# Patient Record
Sex: MALE | Race: WHITE | Employment: FULL TIME | URBAN - METROPOLITAN AREA
[De-identification: names, ages, dates, MRNs, and addresses within clinical notes are randomized per-mention and may not be internally consistent; named-entity substitution may affect disease eponyms.]

---

## 2024-03-21 ENCOUNTER — TELEPHONE (OUTPATIENT)
Age: 44
End: 2024-03-21

## 2024-03-21 ENCOUNTER — DIRECT ADMIT ORDERS (OUTPATIENT)
Age: 44
End: 2024-03-21

## 2024-03-21 DIAGNOSIS — R94.39 ABNORMAL CARDIOVASCULAR STRESS TEST: Primary | ICD-10-CM

## 2024-03-21 DIAGNOSIS — R94.39 ABNORMAL STRESS TEST: Primary | ICD-10-CM

## 2024-03-21 RX ORDER — SODIUM CHLORIDE 0.9 % (FLUSH) 0.9 %
5-40 SYRINGE (ML) INJECTION EVERY 12 HOURS SCHEDULED
OUTPATIENT
Start: 2024-04-05

## 2024-03-21 RX ORDER — SODIUM CHLORIDE 0.9 % (FLUSH) 0.9 %
5-40 SYRINGE (ML) INJECTION PRN
OUTPATIENT
Start: 2024-04-05

## 2024-03-21 RX ORDER — SODIUM CHLORIDE 9 MG/ML
INJECTION, SOLUTION INTRAVENOUS PRN
OUTPATIENT
Start: 2024-04-05

## 2024-03-21 NOTE — TELEPHONE ENCOUNTER
Spoke with Pt of [facility] schd. For 4/5/24 At 10:30am arrive at 9am Pt aware that they need a  they must stay on site NPO from Midnight the night before. Check in at the second floor Outpt. Reg. Desk. Pt is to have Labs done between 3/21/24-3/29/24 at [facility].   Medications:  Ok to take   VO by Dr. Lombardo/nurse Sarah Macedo Nurse.

## 2024-03-22 ENCOUNTER — DIRECT ADMIT ORDERS (OUTPATIENT)
Age: 44
End: 2024-03-22

## 2024-03-22 DIAGNOSIS — R94.39 ABNORMAL CARDIOVASCULAR STRESS TEST: Primary | ICD-10-CM

## 2024-03-22 RX ORDER — SODIUM CHLORIDE 0.9 % (FLUSH) 0.9 %
5-40 SYRINGE (ML) INJECTION PRN
OUTPATIENT
Start: 2024-03-26

## 2024-03-22 RX ORDER — SODIUM CHLORIDE 0.9 % (FLUSH) 0.9 %
5-40 SYRINGE (ML) INJECTION EVERY 12 HOURS SCHEDULED
OUTPATIENT
Start: 2024-03-26

## 2024-03-22 RX ORDER — SODIUM CHLORIDE 9 MG/ML
INJECTION, SOLUTION INTRAVENOUS PRN
OUTPATIENT
Start: 2024-03-26

## 2024-03-22 NOTE — TELEPHONE ENCOUNTER
Called Pt and let him know that Dr. Macedo office want procedure sooner due to pt having more systems. I have him scheduled for 3/26/24 at 9:15am arriving at 7:45am they are working on the Auth

## 2024-03-25 NOTE — TELEPHONE ENCOUNTER
Spoke To Pt:  Just a reminder not to forget your LHC  scheduled for tomorrow.  Arriving at 7:45am  You will need a  must stay on site  NPO from midnight   check in at the 2nd floor outpt. reg. Desk.  Medications:  Ok to take  VO by /Dr. Macedo/nurse Sarah Macedo Nurse.

## 2024-03-26 ENCOUNTER — HOSPITAL ENCOUNTER (OUTPATIENT)
Facility: HOSPITAL | Age: 44
Setting detail: OUTPATIENT SURGERY
Discharge: HOME | End: 2024-03-26
Attending: STUDENT IN AN ORGANIZED HEALTH CARE EDUCATION/TRAINING PROGRAM | Admitting: STUDENT IN AN ORGANIZED HEALTH CARE EDUCATION/TRAINING PROGRAM
Payer: COMMERCIAL

## 2024-03-26 VITALS
BODY MASS INDEX: 38.36 KG/M2 | SYSTOLIC BLOOD PRESSURE: 118 MMHG | HEIGHT: 76 IN | HEART RATE: 53 BPM | WEIGHT: 315 LBS | DIASTOLIC BLOOD PRESSURE: 74 MMHG | RESPIRATION RATE: 16 BRPM | TEMPERATURE: 97.1 F | OXYGEN SATURATION: 99 %

## 2024-03-26 DIAGNOSIS — Z95.5 STENTED CORONARY ARTERY: Primary | ICD-10-CM

## 2024-03-26 DIAGNOSIS — R94.39 ABNORMAL STRESS TEST: ICD-10-CM

## 2024-03-26 DIAGNOSIS — R94.39 ABNORMAL CARDIOVASCULAR STRESS TEST: ICD-10-CM

## 2024-03-26 LAB
ACT BLD: 228 SECS (ref 79–138)
ACT BLD: 244 SECS (ref 79–138)
CHOLEST SERPL-MCNC: 104 MG/DL
EKG ATRIAL RATE: 45 BPM
EKG DIAGNOSIS: NORMAL
EKG P AXIS: 21 DEGREES
EKG P-R INTERVAL: 172 MS
EKG Q-T INTERVAL: 428 MS
EKG QRS DURATION: 96 MS
EKG QTC CALCULATION (BAZETT): 370 MS
EKG R AXIS: 48 DEGREES
EKG T AXIS: 42 DEGREES
EKG VENTRICULAR RATE: 45 BPM
HDLC SERPL-MCNC: 26 MG/DL
HDLC SERPL: 4 (ref 0–5)
LDLC SERPL CALC-MCNC: 68.8 MG/DL (ref 0–100)
TRIGL SERPL-MCNC: 46 MG/DL
VLDLC SERPL CALC-MCNC: 9.2 MG/DL

## 2024-03-26 PROCEDURE — 92978 ENDOLUMINL IVUS OCT C 1ST: CPT | Performed by: STUDENT IN AN ORGANIZED HEALTH CARE EDUCATION/TRAINING PROGRAM

## 2024-03-26 PROCEDURE — 93010 ELECTROCARDIOGRAM REPORT: CPT | Performed by: INTERNAL MEDICINE

## 2024-03-26 PROCEDURE — C9600 PERC DRUG-EL COR STENT SING: HCPCS | Performed by: STUDENT IN AN ORGANIZED HEALTH CARE EDUCATION/TRAINING PROGRAM

## 2024-03-26 PROCEDURE — 85347 COAGULATION TIME ACTIVATED: CPT

## 2024-03-26 PROCEDURE — 99152 MOD SED SAME PHYS/QHP 5/>YRS: CPT | Performed by: STUDENT IN AN ORGANIZED HEALTH CARE EDUCATION/TRAINING PROGRAM

## 2024-03-26 PROCEDURE — 76937 US GUIDE VASCULAR ACCESS: CPT | Performed by: STUDENT IN AN ORGANIZED HEALTH CARE EDUCATION/TRAINING PROGRAM

## 2024-03-26 PROCEDURE — C1887 CATHETER, GUIDING: HCPCS | Performed by: STUDENT IN AN ORGANIZED HEALTH CARE EDUCATION/TRAINING PROGRAM

## 2024-03-26 PROCEDURE — 93458 L HRT ARTERY/VENTRICLE ANGIO: CPT | Performed by: STUDENT IN AN ORGANIZED HEALTH CARE EDUCATION/TRAINING PROGRAM

## 2024-03-26 PROCEDURE — 92979 ENDOLUMINL IVUS OCT C EA: CPT | Performed by: STUDENT IN AN ORGANIZED HEALTH CARE EDUCATION/TRAINING PROGRAM

## 2024-03-26 PROCEDURE — C1769 GUIDE WIRE: HCPCS | Performed by: STUDENT IN AN ORGANIZED HEALTH CARE EDUCATION/TRAINING PROGRAM

## 2024-03-26 PROCEDURE — 80061 LIPID PANEL: CPT

## 2024-03-26 PROCEDURE — 85347 COAGULATION TIME ACTIVATED: CPT | Performed by: STUDENT IN AN ORGANIZED HEALTH CARE EDUCATION/TRAINING PROGRAM

## 2024-03-26 PROCEDURE — 2500000003 HC RX 250 WO HCPCS: Performed by: STUDENT IN AN ORGANIZED HEALTH CARE EDUCATION/TRAINING PROGRAM

## 2024-03-26 PROCEDURE — 92920 PRQ TRLUML C ANGIOP 1ART&/BR: CPT | Performed by: STUDENT IN AN ORGANIZED HEALTH CARE EDUCATION/TRAINING PROGRAM

## 2024-03-26 PROCEDURE — C1874 STENT, COATED/COV W/DEL SYS: HCPCS | Performed by: STUDENT IN AN ORGANIZED HEALTH CARE EDUCATION/TRAINING PROGRAM

## 2024-03-26 PROCEDURE — 2709999900 HC NON-CHARGEABLE SUPPLY: Performed by: STUDENT IN AN ORGANIZED HEALTH CARE EDUCATION/TRAINING PROGRAM

## 2024-03-26 PROCEDURE — 92929 PR PRQ TRLUML CORONARY STENT W/ANGIO ADDL ART/BRNCH: CPT | Performed by: STUDENT IN AN ORGANIZED HEALTH CARE EDUCATION/TRAINING PROGRAM

## 2024-03-26 PROCEDURE — 6370000000 HC RX 637 (ALT 250 FOR IP): Performed by: STUDENT IN AN ORGANIZED HEALTH CARE EDUCATION/TRAINING PROGRAM

## 2024-03-26 PROCEDURE — C1753 CATH, INTRAVAS ULTRASOUND: HCPCS | Performed by: STUDENT IN AN ORGANIZED HEALTH CARE EDUCATION/TRAINING PROGRAM

## 2024-03-26 PROCEDURE — 82172 ASSAY OF APOLIPOPROTEIN: CPT

## 2024-03-26 PROCEDURE — 99153 MOD SED SAME PHYS/QHP EA: CPT | Performed by: STUDENT IN AN ORGANIZED HEALTH CARE EDUCATION/TRAINING PROGRAM

## 2024-03-26 PROCEDURE — 6360000002 HC RX W HCPCS: Performed by: STUDENT IN AN ORGANIZED HEALTH CARE EDUCATION/TRAINING PROGRAM

## 2024-03-26 PROCEDURE — 92928 PRQ TCAT PLMT NTRAC ST 1 LES: CPT | Performed by: STUDENT IN AN ORGANIZED HEALTH CARE EDUCATION/TRAINING PROGRAM

## 2024-03-26 PROCEDURE — 36415 COLL VENOUS BLD VENIPUNCTURE: CPT

## 2024-03-26 PROCEDURE — 93005 ELECTROCARDIOGRAM TRACING: CPT | Performed by: STUDENT IN AN ORGANIZED HEALTH CARE EDUCATION/TRAINING PROGRAM

## 2024-03-26 PROCEDURE — C1894 INTRO/SHEATH, NON-LASER: HCPCS | Performed by: STUDENT IN AN ORGANIZED HEALTH CARE EDUCATION/TRAINING PROGRAM

## 2024-03-26 PROCEDURE — 6360000004 HC RX CONTRAST MEDICATION: Performed by: STUDENT IN AN ORGANIZED HEALTH CARE EDUCATION/TRAINING PROGRAM

## 2024-03-26 PROCEDURE — C1725 CATH, TRANSLUMIN NON-LASER: HCPCS | Performed by: STUDENT IN AN ORGANIZED HEALTH CARE EDUCATION/TRAINING PROGRAM

## 2024-03-26 DEVICE — STENT ONYXNG35026UX ONYX 3.50X26RX
Type: IMPLANTABLE DEVICE | Status: FUNCTIONAL
Brand: ONYX FRONTIER™

## 2024-03-26 RX ORDER — MIDAZOLAM HYDROCHLORIDE 1 MG/ML
INJECTION INTRAMUSCULAR; INTRAVENOUS PRN
Status: DISCONTINUED | OUTPATIENT
Start: 2024-03-26 | End: 2024-03-26 | Stop reason: HOSPADM

## 2024-03-26 RX ORDER — SODIUM CHLORIDE 0.9 % (FLUSH) 0.9 %
5-40 SYRINGE (ML) INJECTION EVERY 12 HOURS SCHEDULED
Status: DISCONTINUED | OUTPATIENT
Start: 2024-03-26 | End: 2024-03-26 | Stop reason: HOSPADM

## 2024-03-26 RX ORDER — VERAPAMIL HYDROCHLORIDE 2.5 MG/ML
INJECTION, SOLUTION INTRAVENOUS PRN
Status: DISCONTINUED | OUTPATIENT
Start: 2024-03-26 | End: 2024-03-26 | Stop reason: HOSPADM

## 2024-03-26 RX ORDER — ACETAMINOPHEN 325 MG/1
650 TABLET ORAL EVERY 4 HOURS PRN
Status: DISCONTINUED | OUTPATIENT
Start: 2024-03-26 | End: 2024-03-26 | Stop reason: HOSPADM

## 2024-03-26 RX ORDER — LIDOCAINE HYDROCHLORIDE 10 MG/ML
INJECTION, SOLUTION INFILTRATION; PERINEURAL PRN
Status: DISCONTINUED | OUTPATIENT
Start: 2024-03-26 | End: 2024-03-26 | Stop reason: HOSPADM

## 2024-03-26 RX ORDER — ATORVASTATIN CALCIUM 40 MG/1
40 TABLET, FILM COATED ORAL DAILY
Status: ON HOLD | COMMUNITY
End: 2024-03-26 | Stop reason: HOSPADM

## 2024-03-26 RX ORDER — METOPROLOL SUCCINATE 50 MG/1
50 TABLET, EXTENDED RELEASE ORAL DAILY
COMMUNITY

## 2024-03-26 RX ORDER — LISINOPRIL 30 MG/1
30 TABLET ORAL DAILY
COMMUNITY

## 2024-03-26 RX ORDER — ASPIRIN 81 MG/1
81 TABLET ORAL DAILY
COMMUNITY

## 2024-03-26 RX ORDER — SODIUM CHLORIDE 9 MG/ML
INJECTION, SOLUTION INTRAVENOUS PRN
Status: DISCONTINUED | OUTPATIENT
Start: 2024-03-26 | End: 2024-03-26 | Stop reason: HOSPADM

## 2024-03-26 RX ORDER — ROSUVASTATIN CALCIUM 40 MG/1
40 TABLET, COATED ORAL DAILY
Qty: 90 TABLET | Refills: 3 | Status: SHIPPED | OUTPATIENT
Start: 2024-03-26

## 2024-03-26 RX ORDER — HEPARIN SODIUM 1000 [USP'U]/ML
INJECTION, SOLUTION INTRAVENOUS; SUBCUTANEOUS PRN
Status: DISCONTINUED | OUTPATIENT
Start: 2024-03-26 | End: 2024-03-26 | Stop reason: HOSPADM

## 2024-03-26 RX ORDER — SODIUM CHLORIDE 0.9 % (FLUSH) 0.9 %
5-40 SYRINGE (ML) INJECTION PRN
Status: DISCONTINUED | OUTPATIENT
Start: 2024-03-26 | End: 2024-03-26 | Stop reason: HOSPADM

## 2024-03-26 RX ORDER — FENTANYL CITRATE 50 UG/ML
INJECTION, SOLUTION INTRAMUSCULAR; INTRAVENOUS PRN
Status: DISCONTINUED | OUTPATIENT
Start: 2024-03-26 | End: 2024-03-26 | Stop reason: HOSPADM

## 2024-03-26 RX ORDER — HEPARIN SODIUM 200 [USP'U]/100ML
INJECTION, SOLUTION INTRAVENOUS PRN
Status: DISCONTINUED | OUTPATIENT
Start: 2024-03-26 | End: 2024-03-26 | Stop reason: HOSPADM

## 2024-03-26 RX ORDER — EZETIMIBE 10 MG/1
10 TABLET ORAL DAILY
Qty: 90 TABLET | Refills: 3 | Status: SHIPPED | OUTPATIENT
Start: 2024-03-26

## 2024-03-26 NOTE — H&P
Raghavendra Lombardo DO  [facility]  [address], [address]  [place] [address]                                       Office [phone],Fax [phone]    Pre- Cardiac Catheterization Procedure Note    Procedure:  cardaic catheterization  Preprocedure diagnosis:  abnormal calcium score and abnormal stress test  Preprocedure testing:   Stress testing and calciu score    History of Presenting Illness:  See scanned COV note    Review of System:  Constitutional: neg  Resp: neg  Card: cp  Neuro: neg      No current facility-administered medications on file prior to encounter.     Current Outpatient Medications on File Prior to Encounter   Medication Sig Dispense Refill    lisinopril (PRINIVIL;ZESTRIL) 30 MG tablet Take 1 tablet by mouth daily      aspirin 81 MG EC tablet Take 1 tablet by mouth daily      metoprolol succinate (TOPROL XL) 50 MG extended release tablet Take 1 tablet by mouth daily      atorvastatin (LIPITOR) 40 MG tablet Take 1 tablet by mouth daily         No Known Allergies    Physican Exam:    Blood pressure 132/78, pulse 51, temperature 97.1 °F (36.2 °C), temperature source Temporal, resp. rate 14, height 1.93 m (6' 4\"), weight (!) 146.7 kg (323 lb 6.4 oz), SpO2 95 %.    Constitutional:  well-developed and well-nourished. No distress.  Pulmonary/Chest: Effort normal and breath sounds normal. No respiratory distress, wheezes or rales.  Cardiovascular: Normal rate, regular rhythm, S1 S2 .   Neurological:  Alert and oriented. Coordination seems grossly normal.   Psychiatric:  Good insight into underlying medical condition.    ASA: 2  Mallampati: 3    Plan:    Risk and benefit of cardiac catheterization/PCI was described in detail to patient.  (risk of vascular access complication with potential surgical intervention for management, stroke, myocardial infarction, emergent open heart surgery and death)    Informed consent was obtained.  Patient wishes to  proceed with invasive study with potential percutaneous treatment.

## 2024-03-26 NOTE — PROCEDURES
BRIEF PROCEDURE NOTE    Date of Procedure: 3/26/2024   Preoperative Diagnosis: elevated calcium score  Postoperative Diagnosis: stable cad    Procedure: Left heart cath, coronary angiography, moderate sedation, PCI leidy lcx/om, ivux lcx/om, ivus rca  Interventional Cardiologist: Raghavendra Lombardo DO  Assistant: None  Anesthesia: local + IV moderate sedation   I administered moderate sedation throughout this procedure. An independent trained observer pushed medications at my direction, and monitored the patient’s level of consciousness and physiological status throughout.  Estimated Blood Loss: Minimal    Access: right radial artery, 6F  Catheters:  Left coronary:JL 3.5, 5f  Right coronary: JR 4, 5f    Findings:   L Main:large caliber, short vessel mild disease  LAD:very large caliber, ectatic, moderate long diagonal, diffuse mild disease  LCx: very large caliber, ectatic, av groove lcx into om with serial disease > 80%, distal branching of om  RCA:very large caliber, ectatic,  focal proximal into mid-vessel stenosis that is not significant by IVUS, long segment of distal disease with mla <4mm2 by IVUS    LVEDP:  18 mmhg    PCI:  Ebu 3.5, 6f guide  Heparin  Doni blue  Dilated with 3.0 compliant balloon  Ivus  3.5x26mm rubén leidy deployed at high pressure  Post-dilated with 4.0 and 5.0 nc balloon at moderate pressure    Ivus showed 4.0 balloon did not completely appose proximal stent.  Angiographically improved after 5.0 balloon.  Ivus showed concentric narrowing proximal to stent with mla of 8.6mm2              Specimens Removed: None    Implants: rubén leidy    Closure Device: radial TR band    See full cath note.    Complications: none      Findings:  1. Severe diffuse coronary ectasia with positively remodeling of vascularture  2. Severe av groove lcx into om treated with leidy  3. Severe disease of distal rca.  Deferred ad hoc pci due to radiation dosage after lcx pci  4. Elevated lvedp    Plan:    Recommend long term DAPT

## 2024-03-26 NOTE — PROGRESS NOTES
8:03 AM  Patient arrived. ID and allergies verified verbally with patient. Pt voices understanding of procedure to be performed. Consent obtained. Pt prepped for procedure.     9:55 AM  TRANSFER - OUT REPORT:    Verbal report given to Liv Lombardo  being transferred to cath lab for ordered procedure       Report consisted of patient's Situation, Background, Assessment and   Recommendations(SBAR).     Information from the following report(s) Nurse Handoff Report was reviewed with the receiving nurse.           Lines:   Peripheral IV 03/26/24 Proximal;Right;Anterior Forearm (Active)   Site Assessment Clean, dry & intact 03/26/24 0844   Line Status Blood return noted;Flushed 03/26/24 0844   Line Care Cap changed 03/26/24 0844   Phlebitis Assessment No symptoms 03/26/24 0844   Infiltration Assessment 0 03/26/24 0844   Alcohol Cap Used Yes 03/26/24 0844   Dressing Status New dressing applied;Clean, dry & intact 03/26/24 0844   Dressing Type Transparent 03/26/24 0844   Dressing Intervention New 03/26/24 0844        Opportunity for questions and clarification was provided.      Patient transported with:  Registered Nurse    11:15 AM  TRANSFER - IN REPORT:    Verbal report received from Tori colin Lombardo  being received from cath lab for routine post-op      Report consisted of patient's Situation, Background, Assessment and   Recommendations(SBAR).     Information from the following report(s) Nurse Handoff Report was reviewed with the receiving nurse.    Opportunity for questions and clarification was provided.      Assessment completed upon patient's arrival to unit and care assumed.     PCI patient meets criteria for outpatient cardiac rehab. [facility] outpatient cardiac rehab referral will be initiated. Educational handouts provided on: PCI procedure, coronary artery disease, coronary artery risk factors, heart healthy eating, and community resources. Reference information on LT[facility] Outpatient  Cardiac Rehab Program also provided. [place] [place] cardiac rehab staff will contact patent, per telephone call, to assess and schedule program participation    1:11 PM  2 ml air released from TR Band. No bleeding or hematoma noted. Radial and Ulnar pulse on right wrist palpable. Pt tolerated well. Will continue to monitor.    1:16 PM  3 ml air released from TR Band. No bleeding or hematoma noted. Radial and Ulnar pulse on right wrist palpable. Pt tolerated well. Will continue to monitor.    1:21 PM  4 ml air released from TR Band. No bleeding or hematoma noted. Radial and Ulnar pulse on right wrist palpable. Pt tolerated well. Will continue to monitor.    1:27 PM  4 ml air released from TR Band. No bleeding or hematoma noted. Radial and Ulnar pulse on right wrist palpable. Pt tolerated well. Will continue to monitor.    Air release completed. TR Band removed from right wrist. No bleeding or  Hematoma. Dressing applied. Wrist immobilizer in place. Radial and ulnar pulse remain palpable on affected extremity. Pt tolerated well. Instructions given to pt regarding movement and activity restrictions. Pt voiced understanding.    2:45 PM  Discharge instructions and prescriptions reviewed with  patient and family. Opportunity provided for questions. Patient and family verbalized understanding. Signed copy of discharge placed in the front of patient's chart. IV and tele removed.     3:00 PM  Pt sat on side of bed then ambulated around unit and to restroom. Voided. Returned to chair. Right wrist site dressing dry and intact. No bleeding or hematoma. Pt voices no complaints.    3:30 PM  Pt discharged via wheelchair with family. Personal belongings with patient upon discharge.

## 2024-03-26 NOTE — CARDIO/PULMONARY
[facility]- Referral.  Chart review completed.  Results of cath/PCI noted.  Met with patient and wife in cath lab pre/post recovery.  PCI patient meets criteria for outpatient cardiac rehab.  [facility] outpatient cardiac rehab referral is initiated.  Educational handouts reviewed and provided on: PCI procedure, coronary artery disease, coronary artery risk factors, heart healthy eating, and community resources.    Pt is able to state his family history risk factor for CAD.   The format and benefits of participating in an outpatient cardiac rehab program were communicated. Reference information on [facility] Outpatient Cardiac Rehab Program is provided.    Pt states he will undergo another PCI procedure in a few weeks.   [place] cardiac rehab staff will contact patent, per telephone call, to assess and schedule program participation.  JOSE Miller RN

## 2024-03-27 ENCOUNTER — TELEPHONE (OUTPATIENT)
Age: 44
End: 2024-03-27

## 2024-03-27 ENCOUNTER — DIRECT ADMIT ORDERS (OUTPATIENT)
Age: 44
End: 2024-03-27

## 2024-03-27 DIAGNOSIS — I25.119 CORONARY ARTERY DISEASE WITH ANGINA PECTORIS, UNSPECIFIED VESSEL OR LESION TYPE, UNSPECIFIED WHETHER NATIVE OR TRANSPLANTED HEART (HCC): Primary | ICD-10-CM

## 2024-03-27 DIAGNOSIS — I25.10 CAD (CORONARY ARTERY DISEASE): Primary | ICD-10-CM

## 2024-03-27 LAB
ECHO BSA: 2.8 M2
LPA SERPL-SCNC: 167.9 NMOL/L

## 2024-03-27 RX ORDER — SODIUM CHLORIDE 0.9 % (FLUSH) 0.9 %
5-40 SYRINGE (ML) INJECTION EVERY 12 HOURS SCHEDULED
OUTPATIENT
Start: 2024-04-12

## 2024-03-27 RX ORDER — SODIUM CHLORIDE 0.9 % (FLUSH) 0.9 %
5-40 SYRINGE (ML) INJECTION PRN
OUTPATIENT
Start: 2024-04-12

## 2024-03-27 RX ORDER — SODIUM CHLORIDE 9 MG/ML
INJECTION, SOLUTION INTRAVENOUS PRN
OUTPATIENT
Start: 2024-04-12

## 2024-03-27 NOTE — TELEPHONE ENCOUNTER
Spoke with Pt of PCI of RCA schd. For 4/12/24 At 1:00pm arrive at 11:30am Pt aware that they need a  they must stay on site NPO from Midnight the night before. Check in at the second floor Outpt. Reg. Desk. Pt is to have Labs done on 3/25/24   Medications:  Ok to take   VO by /nurse Lamar ROMERO

## 2024-03-27 NOTE — TELEPHONE ENCOUNTER
Spoke with Pt of PCI of RCA schd. For 4/12/24 At 1:00pm arrive at 11:30am Pt aware that they need a  they must stay on site NPO from Midnight the night before. Check in at the second floor Outpt. Reg. Desk. Pt is to have Labs done 3/25/24.   Medications:  Ok to take   VO by /nurse Lamar IVAN

## 2024-03-28 ENCOUNTER — TELEPHONE (OUTPATIENT)
Age: 44
End: 2024-03-28

## 2024-03-28 LAB — APO B SERPL-MCNC: 65 MG/DL

## 2024-03-28 NOTE — TELEPHONE ENCOUNTER
Per Dr. Raghavendra Lombardo:    Please let patient know that his Lp(a) is mildly elevated at 168     ##################################    Spoke with the pts daughter,  Christal, identified the pt with name and . I asked her to have the pt call us for his lab results, she agreed    ####################################

## 2024-03-29 NOTE — TELEPHONE ENCOUNTER
Spoke with the pt, identified the pt with name and . I informed him of his results, and that at this time Dr. Lombardo has not offered any further plan. I advised the pt that I had read that CO-Q 10 can help. Pt stated he is already taking this and will continue.    Pt also said that since his procedure he feels a LOT better, and it's hard not be more active, but he's working on taking it easy until after his upcoming stent.

## 2024-04-11 NOTE — TELEPHONE ENCOUNTER
Spoke To Pt:  Just a reminder not to forget your PCI of RCA scheduled for tomorrow.  Arriving at 11:30am  You will need a  must stay on site  NPO from midnight   check in at the 2nd floor outpt. reg. Desk.  Medications:  Ok to take  Pt is taking Birlinta check with Harriet T if he needs to hold it and she said no   VO by /nurse Lamar IVAN

## 2024-04-12 ENCOUNTER — HOSPITAL ENCOUNTER (OUTPATIENT)
Facility: HOSPITAL | Age: 44
Setting detail: OUTPATIENT SURGERY
Discharge: HOME | End: 2024-04-12
Attending: STUDENT IN AN ORGANIZED HEALTH CARE EDUCATION/TRAINING PROGRAM | Admitting: STUDENT IN AN ORGANIZED HEALTH CARE EDUCATION/TRAINING PROGRAM
Payer: COMMERCIAL

## 2024-04-12 VITALS
DIASTOLIC BLOOD PRESSURE: 65 MMHG | TEMPERATURE: 97.9 F | HEIGHT: 77 IN | BODY MASS INDEX: 37.03 KG/M2 | HEART RATE: 48 BPM | SYSTOLIC BLOOD PRESSURE: 127 MMHG | WEIGHT: 313.6 LBS | RESPIRATION RATE: 15 BRPM | OXYGEN SATURATION: 97 %

## 2024-04-12 DIAGNOSIS — I25.10 CAD (CORONARY ARTERY DISEASE): ICD-10-CM

## 2024-04-12 DIAGNOSIS — I25.10 CORONARY ARTERY DISEASE: ICD-10-CM

## 2024-04-12 LAB
ACT BLD: 233 SECS (ref 79–138)
ACT BLD: 320 SECS (ref 79–138)
ANION GAP SERPL CALC-SCNC: 3 MMOL/L (ref 5–15)
BUN SERPL-MCNC: 13 MG/DL (ref 6–20)
BUN/CREAT SERPL: 13 (ref 12–20)
CALCIUM SERPL-MCNC: 9.2 MG/DL (ref 8.5–10.1)
CHLORIDE SERPL-SCNC: 110 MMOL/L (ref 97–108)
CO2 SERPL-SCNC: 26 MMOL/L (ref 21–32)
CREAT SERPL-MCNC: 1.02 MG/DL (ref 0.7–1.3)
ECHO BSA: 2.78 M2
ERYTHROCYTE [DISTWIDTH] IN BLOOD BY AUTOMATED COUNT: 12.6 % (ref 11.5–14.5)
GLUCOSE SERPL-MCNC: 95 MG/DL (ref 65–100)
HCT VFR BLD AUTO: 42.4 % (ref 36.6–50.3)
HGB BLD-MCNC: 14.8 G/DL (ref 12.1–17)
MCH RBC QN AUTO: 29.8 PG (ref 26–34)
MCHC RBC AUTO-ENTMCNC: 34.9 G/DL (ref 30–36.5)
MCV RBC AUTO: 85.3 FL (ref 80–99)
NRBC # BLD: 0 K/UL (ref 0–0.01)
NRBC BLD-RTO: 0 PER 100 WBC
PLATELET # BLD AUTO: 257 K/UL (ref 150–400)
PMV BLD AUTO: 11.3 FL (ref 8.9–12.9)
POTASSIUM SERPL-SCNC: 4.4 MMOL/L (ref 3.5–5.1)
RBC # BLD AUTO: 4.97 M/UL (ref 4.1–5.7)
SODIUM SERPL-SCNC: 139 MMOL/L (ref 136–145)
WBC # BLD AUTO: 9.9 K/UL (ref 4.1–11.1)

## 2024-04-12 PROCEDURE — 92928 PRQ TCAT PLMT NTRAC ST 1 LES: CPT | Performed by: STUDENT IN AN ORGANIZED HEALTH CARE EDUCATION/TRAINING PROGRAM

## 2024-04-12 PROCEDURE — 85027 COMPLETE CBC AUTOMATED: CPT

## 2024-04-12 PROCEDURE — C1894 INTRO/SHEATH, NON-LASER: HCPCS | Performed by: STUDENT IN AN ORGANIZED HEALTH CARE EDUCATION/TRAINING PROGRAM

## 2024-04-12 PROCEDURE — 93454 CORONARY ARTERY ANGIO S&I: CPT | Performed by: STUDENT IN AN ORGANIZED HEALTH CARE EDUCATION/TRAINING PROGRAM

## 2024-04-12 PROCEDURE — 92978 ENDOLUMINL IVUS OCT C 1ST: CPT | Performed by: STUDENT IN AN ORGANIZED HEALTH CARE EDUCATION/TRAINING PROGRAM

## 2024-04-12 PROCEDURE — 36415 COLL VENOUS BLD VENIPUNCTURE: CPT

## 2024-04-12 PROCEDURE — C9601 PERC DRUG-EL COR STENT BRAN: HCPCS | Performed by: STUDENT IN AN ORGANIZED HEALTH CARE EDUCATION/TRAINING PROGRAM

## 2024-04-12 PROCEDURE — C1874 STENT, COATED/COV W/DEL SYS: HCPCS | Performed by: STUDENT IN AN ORGANIZED HEALTH CARE EDUCATION/TRAINING PROGRAM

## 2024-04-12 PROCEDURE — C1753 CATH, INTRAVAS ULTRASOUND: HCPCS | Performed by: STUDENT IN AN ORGANIZED HEALTH CARE EDUCATION/TRAINING PROGRAM

## 2024-04-12 PROCEDURE — C1887 CATHETER, GUIDING: HCPCS | Performed by: STUDENT IN AN ORGANIZED HEALTH CARE EDUCATION/TRAINING PROGRAM

## 2024-04-12 PROCEDURE — 99152 MOD SED SAME PHYS/QHP 5/>YRS: CPT | Performed by: STUDENT IN AN ORGANIZED HEALTH CARE EDUCATION/TRAINING PROGRAM

## 2024-04-12 PROCEDURE — 2500000003 HC RX 250 WO HCPCS: Performed by: STUDENT IN AN ORGANIZED HEALTH CARE EDUCATION/TRAINING PROGRAM

## 2024-04-12 PROCEDURE — 6360000004 HC RX CONTRAST MEDICATION: Performed by: STUDENT IN AN ORGANIZED HEALTH CARE EDUCATION/TRAINING PROGRAM

## 2024-04-12 PROCEDURE — 85347 COAGULATION TIME ACTIVATED: CPT

## 2024-04-12 PROCEDURE — C9600 PERC DRUG-EL COR STENT SING: HCPCS | Performed by: STUDENT IN AN ORGANIZED HEALTH CARE EDUCATION/TRAINING PROGRAM

## 2024-04-12 PROCEDURE — 2709999900 HC NON-CHARGEABLE SUPPLY: Performed by: STUDENT IN AN ORGANIZED HEALTH CARE EDUCATION/TRAINING PROGRAM

## 2024-04-12 PROCEDURE — 93005 ELECTROCARDIOGRAM TRACING: CPT | Performed by: EMERGENCY MEDICINE

## 2024-04-12 PROCEDURE — 99153 MOD SED SAME PHYS/QHP EA: CPT | Performed by: STUDENT IN AN ORGANIZED HEALTH CARE EDUCATION/TRAINING PROGRAM

## 2024-04-12 PROCEDURE — 80048 BASIC METABOLIC PNL TOTAL CA: CPT

## 2024-04-12 PROCEDURE — 85347 COAGULATION TIME ACTIVATED: CPT | Performed by: STUDENT IN AN ORGANIZED HEALTH CARE EDUCATION/TRAINING PROGRAM

## 2024-04-12 PROCEDURE — C1725 CATH, TRANSLUMIN NON-LASER: HCPCS | Performed by: STUDENT IN AN ORGANIZED HEALTH CARE EDUCATION/TRAINING PROGRAM

## 2024-04-12 PROCEDURE — 6370000000 HC RX 637 (ALT 250 FOR IP): Performed by: STUDENT IN AN ORGANIZED HEALTH CARE EDUCATION/TRAINING PROGRAM

## 2024-04-12 PROCEDURE — 92929 PR PRQ TRLUML CORONARY STENT W/ANGIO ADDL ART/BRNCH: CPT | Performed by: STUDENT IN AN ORGANIZED HEALTH CARE EDUCATION/TRAINING PROGRAM

## 2024-04-12 PROCEDURE — 6360000002 HC RX W HCPCS: Performed by: STUDENT IN AN ORGANIZED HEALTH CARE EDUCATION/TRAINING PROGRAM

## 2024-04-12 PROCEDURE — C1769 GUIDE WIRE: HCPCS | Performed by: STUDENT IN AN ORGANIZED HEALTH CARE EDUCATION/TRAINING PROGRAM

## 2024-04-12 DEVICE — STENT ONYXNG40012UX ONYX 4.00X12RX
Type: IMPLANTABLE DEVICE | Site: HEART | Status: FUNCTIONAL
Brand: ONYX FRONTIER™

## 2024-04-12 DEVICE — STENT ONYXNG35015UX ONYX 3.50X15RX
Type: IMPLANTABLE DEVICE | Site: HEART | Status: FUNCTIONAL
Brand: ONYX FRONTIER™

## 2024-04-12 DEVICE — STENT ONYXNG30038UX ONYX 3.00X38RX
Type: IMPLANTABLE DEVICE | Site: HEART | Status: FUNCTIONAL
Brand: ONYX FRONTIER™

## 2024-04-12 RX ORDER — SODIUM CHLORIDE 0.9 % (FLUSH) 0.9 %
5-40 SYRINGE (ML) INJECTION EVERY 12 HOURS SCHEDULED
Status: DISCONTINUED | OUTPATIENT
Start: 2024-04-12 | End: 2024-04-12 | Stop reason: HOSPADM

## 2024-04-12 RX ORDER — LIDOCAINE HYDROCHLORIDE 10 MG/ML
INJECTION, SOLUTION INFILTRATION; PERINEURAL PRN
Status: DISCONTINUED | OUTPATIENT
Start: 2024-04-12 | End: 2024-04-12 | Stop reason: HOSPADM

## 2024-04-12 RX ORDER — SODIUM CHLORIDE 0.9 % (FLUSH) 0.9 %
5-40 SYRINGE (ML) INJECTION PRN
Status: DISCONTINUED | OUTPATIENT
Start: 2024-04-12 | End: 2024-04-12 | Stop reason: HOSPADM

## 2024-04-12 RX ORDER — FENTANYL CITRATE 50 UG/ML
INJECTION, SOLUTION INTRAMUSCULAR; INTRAVENOUS PRN
Status: DISCONTINUED | OUTPATIENT
Start: 2024-04-12 | End: 2024-04-12 | Stop reason: HOSPADM

## 2024-04-12 RX ORDER — ACETAMINOPHEN 325 MG/1
650 TABLET ORAL EVERY 4 HOURS PRN
Status: DISCONTINUED | OUTPATIENT
Start: 2024-04-12 | End: 2024-04-12 | Stop reason: HOSPADM

## 2024-04-12 RX ORDER — VERAPAMIL HYDROCHLORIDE 2.5 MG/ML
INJECTION, SOLUTION INTRAVENOUS PRN
Status: DISCONTINUED | OUTPATIENT
Start: 2024-04-12 | End: 2024-04-12 | Stop reason: HOSPADM

## 2024-04-12 RX ORDER — HEPARIN SODIUM 1000 [USP'U]/ML
INJECTION, SOLUTION INTRAVENOUS; SUBCUTANEOUS PRN
Status: DISCONTINUED | OUTPATIENT
Start: 2024-04-12 | End: 2024-04-12 | Stop reason: HOSPADM

## 2024-04-12 RX ORDER — SODIUM CHLORIDE 9 MG/ML
INJECTION, SOLUTION INTRAVENOUS PRN
Status: DISCONTINUED | OUTPATIENT
Start: 2024-04-12 | End: 2024-04-12 | Stop reason: HOSPADM

## 2024-04-12 RX ORDER — MIDAZOLAM HYDROCHLORIDE 1 MG/ML
INJECTION INTRAMUSCULAR; INTRAVENOUS PRN
Status: DISCONTINUED | OUTPATIENT
Start: 2024-04-12 | End: 2024-04-12 | Stop reason: HOSPADM

## 2024-04-12 RX ADMIN — ACETAMINOPHEN 650 MG: 325 TABLET ORAL at 14:39

## 2024-04-12 ASSESSMENT — PAIN DESCRIPTION - ORIENTATION: ORIENTATION: RIGHT

## 2024-04-12 ASSESSMENT — PAIN DESCRIPTION - DESCRIPTORS: DESCRIPTORS: SORE

## 2024-04-12 ASSESSMENT — PAIN SCALES - GENERAL: PAINLEVEL_OUTOF10: 3

## 2024-04-12 ASSESSMENT — PAIN DESCRIPTION - LOCATION: LOCATION: ARM

## 2024-04-12 NOTE — CARDIO/PULMONARY
[facility]- Chart review.  Noted is today's staged PCI.  Pt was seen by cardiac rehab staff on 3/26/24.  Referral for outpatient Phase II cardiac rehab was placed on 3/26/24.  Pt received risk factor, coronary artery angiography and PCI procedure, and nutritional resources at that time.   It was known of the staged procedure occurring today, so Intake appt was not made.   Now that the staged PCI has occurred, Pt will be contacted by [facility] staff to discuss program participation.  JOSE Miller RN

## 2024-04-12 NOTE — PROCEDURES
BRIEF PROCEDURE NOTE    Date of Procedure: 4/12/2024   Preoperative Diagnosis: staged pci of rca  Postoperative Diagnosis:  CAD    Procedure: Left heart cath, coronary angiography, moderate sedation  Interventional Cardiologist: Raghavendra Lombardo DO  Assistant: None  Anesthesia: local + IV moderate sedation   I administered moderate sedation throughout this procedure. An independent trained observer pushed medications at my direction, and monitored the patient’s level of consciousness and physiological status throughout.  Estimated Blood Loss: Minimal    Access: right radial artery, 6F  Catheters:  Left coronary:JL 3.5, 5f  Right coronary: al 0.75 6F    Findings:   Findings:   L Main:large caliber, short vessel mild disease  LAD:very large caliber, ectatic, moderate long diagonal, diffuse mild disease  LCx: very large caliber, ectatic, av groove lcx into om with serial disease > 80%, distal branching of om  RCA:very large caliber, ectatic,  focal proximal into mid-vessel stenosis upon further review by IVUS showed thin cap fatty plaque, long segment of distal disease with mla <4mm2 by IVUS      PCI:  Heparin  AL 0.75 6F guide  Doni blue  Ivus    Distal RCA treated with 3.0x38mm rubén leidy.   Plaque shift proximal.  Overlapping proximal 3.5x12mm rubén leidy.  Stents post-dilated with 3.5 nc balloon at high pressure.    Proximal into mid-RCA treated with 4.0x12mm rubén leidy, post-dilated with 5.0 nc balloon.    Ivus showed expanded stents      Specimens Removed: None    Implants: rubén leidy x 2    Closure Device: radial TR band    See full cath note.    Complications: none      Findings:  1. Distal RCA treaated with overlapping LEIDY x2  2. Proximal RCA treated with LEIDY    Plan:    DAPT  Elevated lp(a).  Recommend repeat lipids, may benefit from pcsk9i therapy    [place] Raghavendra Desai DO  [address], [address]  [place] [address]                                              Office [phone],Fax [phone]

## 2024-04-12 NOTE — PROGRESS NOTES
12:15 PM  Patient arrived. ID and allergies verified verbally with patient. Pt voices understanding of procedure to be performed. Consent obtained. Pt prepped for procedure. Pt denies contrast allergy. Patient denies taking any blood thinners.    12:52 PM  TRANSFER - OUT REPORT:    Verbal report given to FLORENCE Sims on Sebastian Lombardo  being transferred to cath lab for ordered procedure       Report consisted of patient's Situation, Background, Assessment and   Recommendations(SBAR).     Information from the following report(s) Nurse Handoff Report was reviewed with the receiving nurse.      Lines:   Peripheral IV 04/12/24 Right Forearm (Active)      Opportunity for questions and clarification was provided.      Patient transported with:  Registered Nurse    3:57 PM  3 ml air released from TR Band. No bleeding or hematoma noted. Radial and Ulnar pulse on R wrist palpable. Pt tolerated well. Will continue to monitor.    4:05 PM  3 ml air released from TR Band. No bleeding or hematoma noted. Radial and Ulnar pulse on R wrist palpable. Pt tolerated well. Will continue to monitor.    Discharge instructions given to patient and family. Time given to ask questions and gain clarity. No questions at this time. Patient and family confirm understanding of instructions given. Patient given copy of discharge instructions to take home.    4:10 PM  3 ml air released from TR Band. No bleeding or hematoma noted. Radial and Ulnar pulse on R wrist palpable. Pt tolerated well. Will continue to monitor.    4:15 PM  Air release completed. TR Band removed from R wrist. No bleeding or  Hematoma. Dressing applied. Wrist immobilizer in place. Radial and ulnar pulse remain palpable on affected extremity. Pt tolerated well. Instructions given to pt regarding movement and activity restrictions. Pt voiced understanding.     5:30 PM  Pt discharged via wheelchair with wife. Personal belongings with patient upon discharge.

## 2024-04-12 NOTE — DISCHARGE INSTRUCTIONS
[facility]  RADIAL CARDIAC CATHETERIZATION AND INTERVENTIONAL DISCHARGE INSTRUCTIONS       MEDICATIONS:  Take only medications ordered by your provider.     ACTIVITIES:  While the wound is healing; bleeding or swelling can occur as a result of stress or strain.  Carefully follow these guidelines:    DO NOT DRIVE for 24 hours after the procedure or as instructed by your provider.  You may shower 24 hours after your procedure. No soaking the wrist for 7 days (i.e., bath tub, swimming, washing dishes).  You may return to work in 3 days.  It is essential that you DO NOT SMOKE.  Do not bend your wrist for 24 hours.  Do not lift more than 3-5 pounds with affected wrist for 1 week.     SITE CARE:  Keep site clean and dry.  Avoid lotions, ointments or powders at the wound site for 1 week.  Check the procedural site for any signs of infection (redness, drainage, swelling).  You may notice a small, hard lump or small bruise at the puncture site. This is normal and will clear in about 2 weeks.  If the lump increases in size; apply firm, direct pressure over the site. Notify your physician.  If there is a small amount of bleeding at the site; apply firm, direct continuous pressure over the site against the puncture site for 10 minutes. Your nurse will review this with you. Notify your physician.  If bleeding does not stop after 10 minutes or if there is a large amount of bleeding, call for an ambulance immediately. Continue to hold pressure until help arrives.     WHEN TO CALL YOUR DOCTOR:  Angina - if your angina symptoms return; use your nitroglycerin as instructed by your provider. If you do not have nitroglycerin or if your symptoms do not completely resolve after 10 -15 minutes, call an ambulance. Do not drive yourself to the hospital.  Warmth, redness, drainage and/or pain at the procedural site or temperature over 101°F.  Persistent tenderness or pain at procedural site.  Swelling, coolness, numbness and

## 2024-04-12 NOTE — H&P
Raghavendra Lombardo DO  [facility]  [address], [address]  [place] [address]                                       Office [phone],Fax [phone]    Pre- Cardiac Catheterization Procedure Note    Procedure:  pci rca  Preprocedure diagnosis:  cad  Preprocedure testin24    CARDIAC PROCEDURE 2024  4:32 PM (Final)    Conclusion    Severe diffuse coronary ectasia with positively remodeling of vascularture    Severe av groove lcx into om treated with leidy    Severe disease of distal rca.  Deferred ad hoc pci due to radiation dosage after lcx pci    Elevated lvedp    Findings:  L Main:large caliber, short vessel mild disease  LAD:very large caliber, ectatic, moderate long diagonal, diffuse mild disease  LCx: very large caliber, ectatic, av groove lcx into om with serial disease > 80%, distal branching of om  RCA:very large caliber, ectatic,  focal proximal into mid-vessel stenosis that is not significant by IVUS, long segment of distal disease with mla <4mm2 by IVUS    LVEDP:  18 mmhg    PCI:  Ebu 3.5, 6f guide  Heparin  Doni blue  Dilated with 3.0 compliant balloon  Ivus  3.5x26mm rubén leidy deployed at high pressure  Post-dilated with 4.0 and 5.0 nc balloon at moderate pressure    Ivus showed 4.0 balloon did not completely appose proximal stent.  Angiographically improved after 5.0 balloon.  Ivus showed concentric narrowing proximal to stent with mla of 8.6mm2    Complications: none    Signed by: Raghavendra Lombardo DO on 3/27/2024  4:32 PM      History of Presenting Illness:  Pt presents for staged pci of RCA    Review of System:  Constitutional: neg  Resp: neg  Card: neg  Neuro: neg      No current facility-administered medications on file prior to encounter.     Current Outpatient Medications on File Prior to Encounter   Medication Sig Dispense Refill    lisinopril (PRINIVIL;ZESTRIL) 30 MG tablet Take 1 tablet by mouth daily      aspirin 81 MG EC tablet

## 2024-04-15 LAB
EKG ATRIAL RATE: 52 BPM
EKG DIAGNOSIS: NORMAL
EKG P AXIS: 21 DEGREES
EKG P-R INTERVAL: 178 MS
EKG Q-T INTERVAL: 426 MS
EKG QRS DURATION: 98 MS
EKG QTC CALCULATION (BAZETT): 396 MS
EKG R AXIS: 50 DEGREES
EKG T AXIS: 47 DEGREES
EKG VENTRICULAR RATE: 52 BPM

## 2024-04-15 PROCEDURE — 93010 ELECTROCARDIOGRAM REPORT: CPT | Performed by: SPECIALIST

## 2024-05-13 ENCOUNTER — HOSPITAL ENCOUNTER (OUTPATIENT)
Facility: HOSPITAL | Age: 44
Setting detail: RECURRING SERIES
Discharge: HOME | End: 2024-05-16
Attending: STUDENT IN AN ORGANIZED HEALTH CARE EDUCATION/TRAINING PROGRAM
Payer: COMMERCIAL

## 2024-05-13 PROCEDURE — 93798 PHYS/QHP OP CAR RHAB W/ECG: CPT

## 2024-05-13 PROCEDURE — 93797 PHYS/QHP OP CAR RHAB WO ECG: CPT

## 2024-05-13 RX ORDER — UBIDECARENONE 30 MG
100 CAPSULE ORAL DAILY
COMMUNITY

## 2024-05-13 ASSESSMENT — EXERCISE STRESS TEST
PEAK_BP: 136/78
PEAK_RPE: 9
PEAK_RPE: 9
PEAK_HR: 78
PEAK_METS: 2.9
PEAK_HR: 78
PEAK_BP: 136/78

## 2024-05-13 ASSESSMENT — PATIENT HEALTH QUESTIONNAIRE - PHQ9
SUM OF ALL RESPONSES TO PHQ QUESTIONS 1-9: 0
4. FEELING TIRED OR HAVING LITTLE ENERGY: NOT AT ALL
2. FEELING DOWN, DEPRESSED OR HOPELESS: NOT AT ALL
SUM OF ALL RESPONSES TO PHQ QUESTIONS 1-9: 0
10. IF YOU CHECKED OFF ANY PROBLEMS, HOW DIFFICULT HAVE THESE PROBLEMS MADE IT FOR YOU TO DO YOUR WORK, TAKE CARE OF THINGS AT HOME, OR GET ALONG WITH OTHER PEOPLE: NOT DIFFICULT AT ALL
SUM OF ALL RESPONSES TO PHQ QUESTIONS 1-9: 0
9. THOUGHTS THAT YOU WOULD BE BETTER OFF DEAD, OR OF HURTING YOURSELF: NOT AT ALL
SUM OF ALL RESPONSES TO PHQ9 QUESTIONS 1 & 2: 0
8. MOVING OR SPEAKING SO SLOWLY THAT OTHER PEOPLE COULD HAVE NOTICED. OR THE OPPOSITE, BEING SO FIGETY OR RESTLESS THAT YOU HAVE BEEN MOVING AROUND A LOT MORE THAN USUAL: NOT AT ALL
3. TROUBLE FALLING OR STAYING ASLEEP: NOT AT ALL
7. TROUBLE CONCENTRATING ON THINGS, SUCH AS READING THE NEWSPAPER OR WATCHING TELEVISION: NOT AT ALL
1. LITTLE INTEREST OR PLEASURE IN DOING THINGS: NOT AT ALL
5. POOR APPETITE OR OVEREATING: NOT AT ALL
6. FEELING BAD ABOUT YOURSELF - OR THAT YOU ARE A FAILURE OR HAVE LET YOURSELF OR YOUR FAMILY DOWN: NOT AT ALL
SUM OF ALL RESPONSES TO PHQ QUESTIONS 1-9: 0

## 2024-05-13 ASSESSMENT — LIFESTYLE VARIABLES
ALCOHOL_TYPE: BEER
ALCOHOL_AMOUNT: 2
SMOKELESS_TOBACCO: NO
ALCOHOL_USE: WEEKLY

## 2024-05-13 ASSESSMENT — EJECTION FRACTION: EF_VALUE: 55

## 2024-05-13 NOTE — CARDIO/PULMONARY
final MET level of 2.9. Prior to October 2023, pt reported he had been walking 6-8 miles at least twice a week.     Exercise prescription developed using exercise tolerance results and patient stated goals, to be supplemented with home exercise recommendations. Education manual given to patient and reviewed. Patient will attend cardiac rehab 2-3 times/week over 18 weeks, which will include both exercise and education sessions.    Patient states that he would like to accomplish the following by completion of the program: get back to walking regularly, learn more about heart healthy diet, and lose weight.    Intake Start Time: 0830  Intake End Time: 1001    CARYN KELLY RN

## 2024-05-17 ENCOUNTER — HOSPITAL ENCOUNTER (OUTPATIENT)
Facility: HOSPITAL | Age: 44
Setting detail: RECURRING SERIES
Discharge: HOME | End: 2024-05-20
Attending: STUDENT IN AN ORGANIZED HEALTH CARE EDUCATION/TRAINING PROGRAM
Payer: COMMERCIAL

## 2024-05-17 VITALS — WEIGHT: 308.4 LBS | BODY MASS INDEX: 36.57 KG/M2

## 2024-05-17 VITALS — WEIGHT: 310.4 LBS | HEIGHT: 77 IN | BODY MASS INDEX: 36.65 KG/M2

## 2024-05-17 PROCEDURE — 93798 PHYS/QHP OP CAR RHAB W/ECG: CPT

## 2024-05-17 ASSESSMENT — EXERCISE STRESS TEST
PEAK_HR: 94
PEAK_METS: 2.9
PEAK_RPE: 10

## 2024-05-20 ENCOUNTER — HOSPITAL ENCOUNTER (OUTPATIENT)
Facility: HOSPITAL | Age: 44
Setting detail: RECURRING SERIES
Discharge: HOME | End: 2024-05-23
Attending: STUDENT IN AN ORGANIZED HEALTH CARE EDUCATION/TRAINING PROGRAM
Payer: COMMERCIAL

## 2024-05-20 VITALS — BODY MASS INDEX: 36.52 KG/M2 | WEIGHT: 308 LBS

## 2024-05-20 PROCEDURE — 93798 PHYS/QHP OP CAR RHAB W/ECG: CPT

## 2024-05-20 ASSESSMENT — EXERCISE STRESS TEST
PEAK_HR: 100
PEAK_METS: 2.9
PEAK_RPE: 10

## 2024-05-24 ENCOUNTER — HOSPITAL ENCOUNTER (OUTPATIENT)
Facility: HOSPITAL | Age: 44
Setting detail: RECURRING SERIES
Discharge: HOME | End: 2024-05-27
Attending: STUDENT IN AN ORGANIZED HEALTH CARE EDUCATION/TRAINING PROGRAM
Payer: COMMERCIAL

## 2024-05-24 VITALS — WEIGHT: 306.4 LBS | BODY MASS INDEX: 36.33 KG/M2

## 2024-05-24 PROCEDURE — 93798 PHYS/QHP OP CAR RHAB W/ECG: CPT

## 2024-05-24 ASSESSMENT — EXERCISE STRESS TEST
PEAK_METS: 3.4
PEAK_HR: 132
PEAK_RPE: 10

## 2024-05-28 ENCOUNTER — HOSPITAL ENCOUNTER (OUTPATIENT)
Facility: HOSPITAL | Age: 44
Setting detail: RECURRING SERIES
Discharge: HOME | End: 2024-05-31
Attending: STUDENT IN AN ORGANIZED HEALTH CARE EDUCATION/TRAINING PROGRAM
Payer: COMMERCIAL

## 2024-05-28 VITALS — WEIGHT: 306.2 LBS | BODY MASS INDEX: 36.31 KG/M2

## 2024-05-28 PROCEDURE — 93798 PHYS/QHP OP CAR RHAB W/ECG: CPT

## 2024-05-28 ASSESSMENT — EXERCISE STRESS TEST
PEAK_METS: 4.2
PEAK_RPE: 12
PEAK_HR: 147

## 2024-05-31 ENCOUNTER — HOSPITAL ENCOUNTER (OUTPATIENT)
Facility: HOSPITAL | Age: 44
Setting detail: RECURRING SERIES
End: 2024-05-31
Attending: STUDENT IN AN ORGANIZED HEALTH CARE EDUCATION/TRAINING PROGRAM
Payer: COMMERCIAL

## 2024-05-31 VITALS — WEIGHT: 305.8 LBS | BODY MASS INDEX: 36.26 KG/M2

## 2024-05-31 PROCEDURE — 93798 PHYS/QHP OP CAR RHAB W/ECG: CPT

## 2024-05-31 ASSESSMENT — EXERCISE STRESS TEST
PEAK_HR: 151
PEAK_METS: 6.2
PEAK_RPE: 13

## 2024-06-03 ENCOUNTER — HOSPITAL ENCOUNTER (OUTPATIENT)
Facility: HOSPITAL | Age: 44
Setting detail: RECURRING SERIES
Discharge: HOME | End: 2024-06-06
Attending: STUDENT IN AN ORGANIZED HEALTH CARE EDUCATION/TRAINING PROGRAM
Payer: COMMERCIAL

## 2024-06-03 VITALS — WEIGHT: 302.4 LBS | BODY MASS INDEX: 35.86 KG/M2

## 2024-06-03 PROCEDURE — 93798 PHYS/QHP OP CAR RHAB W/ECG: CPT

## 2024-06-03 ASSESSMENT — EXERCISE STRESS TEST
PEAK_HR: 147
PEAK_RPE: 13
PEAK_METS: 4.2

## 2024-06-07 ENCOUNTER — HOSPITAL ENCOUNTER (OUTPATIENT)
Facility: HOSPITAL | Age: 44
Setting detail: RECURRING SERIES
Discharge: HOME | End: 2024-06-10
Attending: STUDENT IN AN ORGANIZED HEALTH CARE EDUCATION/TRAINING PROGRAM
Payer: COMMERCIAL

## 2024-06-07 VITALS — BODY MASS INDEX: 35.86 KG/M2 | WEIGHT: 302.4 LBS

## 2024-06-07 PROCEDURE — 93798 PHYS/QHP OP CAR RHAB W/ECG: CPT

## 2024-06-07 ASSESSMENT — EXERCISE STRESS TEST
PEAK_METS: 4.2
PEAK_RPE: 13
PEAK_HR: 153

## 2024-06-07 ASSESSMENT — LIFESTYLE VARIABLES
SMOKELESS_TOBACCO: NO
ALCOHOL_AMOUNT: 2
ALCOHOL_TYPE: BEER
ALCOHOL_USE: WEEKLY

## 2024-06-07 ASSESSMENT — EJECTION FRACTION: EF_VALUE: 55

## 2024-06-10 ENCOUNTER — HOSPITAL ENCOUNTER (OUTPATIENT)
Facility: HOSPITAL | Age: 44
Setting detail: RECURRING SERIES
Discharge: HOME | End: 2024-06-13
Attending: STUDENT IN AN ORGANIZED HEALTH CARE EDUCATION/TRAINING PROGRAM
Payer: COMMERCIAL

## 2024-06-10 VITALS — BODY MASS INDEX: 36.03 KG/M2 | WEIGHT: 303.8 LBS

## 2024-06-10 PROCEDURE — 93798 PHYS/QHP OP CAR RHAB W/ECG: CPT

## 2024-06-10 ASSESSMENT — EXERCISE STRESS TEST
PEAK_HR: 141
PEAK_METS: 4.2
PEAK_RPE: 13

## 2024-06-12 ENCOUNTER — HOSPITAL ENCOUNTER (OUTPATIENT)
Facility: HOSPITAL | Age: 44
Setting detail: RECURRING SERIES
Discharge: HOME | End: 2024-06-15
Attending: STUDENT IN AN ORGANIZED HEALTH CARE EDUCATION/TRAINING PROGRAM
Payer: COMMERCIAL

## 2024-06-12 VITALS — WEIGHT: 304 LBS | BODY MASS INDEX: 36.05 KG/M2

## 2024-06-12 PROCEDURE — 93798 PHYS/QHP OP CAR RHAB W/ECG: CPT

## 2024-06-12 PROCEDURE — 93797 PHYS/QHP OP CAR RHAB WO ECG: CPT

## 2024-06-12 ASSESSMENT — EXERCISE STRESS TEST
PEAK_METS: 6.1
PEAK_RPE: 13-14
PEAK_HR: 147

## 2024-06-14 ENCOUNTER — APPOINTMENT (OUTPATIENT)
Facility: HOSPITAL | Age: 44
End: 2024-06-14
Attending: STUDENT IN AN ORGANIZED HEALTH CARE EDUCATION/TRAINING PROGRAM
Payer: COMMERCIAL

## 2024-06-17 ENCOUNTER — HOSPITAL ENCOUNTER (OUTPATIENT)
Facility: HOSPITAL | Age: 44
Setting detail: RECURRING SERIES
Discharge: HOME | End: 2024-06-20
Attending: STUDENT IN AN ORGANIZED HEALTH CARE EDUCATION/TRAINING PROGRAM
Payer: COMMERCIAL

## 2024-06-17 VITALS — WEIGHT: 305 LBS | BODY MASS INDEX: 36.17 KG/M2

## 2024-06-17 PROCEDURE — 93798 PHYS/QHP OP CAR RHAB W/ECG: CPT

## 2024-06-17 ASSESSMENT — EXERCISE STRESS TEST
PEAK_HR: 133
PEAK_RPE: 13-14
PEAK_METS: 6.2

## 2024-06-19 ENCOUNTER — HOSPITAL ENCOUNTER (OUTPATIENT)
Facility: HOSPITAL | Age: 44
Setting detail: RECURRING SERIES
Discharge: HOME | End: 2024-06-22
Attending: STUDENT IN AN ORGANIZED HEALTH CARE EDUCATION/TRAINING PROGRAM
Payer: COMMERCIAL

## 2024-06-19 VITALS — WEIGHT: 304 LBS | BODY MASS INDEX: 36.05 KG/M2

## 2024-06-19 PROCEDURE — 93798 PHYS/QHP OP CAR RHAB W/ECG: CPT

## 2024-06-19 PROCEDURE — 93797 PHYS/QHP OP CAR RHAB WO ECG: CPT

## 2024-06-19 ASSESSMENT — LIFESTYLE VARIABLES
ALCOHOL_AMOUNT: 1-2
ALCOHOL_USE: WEEKLY
SMOKELESS_TOBACCO: NO
ALCOHOL_TYPE: BEER

## 2024-06-19 ASSESSMENT — EXERCISE STRESS TEST
PEAK_METS: 6.9
PEAK_RPE: 13-14
PEAK_HR: 149

## 2024-06-19 ASSESSMENT — EJECTION FRACTION: EF_VALUE: 55

## 2024-06-24 ENCOUNTER — APPOINTMENT (OUTPATIENT)
Facility: HOSPITAL | Age: 44
End: 2024-06-24
Attending: STUDENT IN AN ORGANIZED HEALTH CARE EDUCATION/TRAINING PROGRAM
Payer: COMMERCIAL

## 2024-06-26 ENCOUNTER — HOSPITAL ENCOUNTER (OUTPATIENT)
Facility: HOSPITAL | Age: 44
Setting detail: RECURRING SERIES
Discharge: HOME | End: 2024-06-29
Attending: STUDENT IN AN ORGANIZED HEALTH CARE EDUCATION/TRAINING PROGRAM
Payer: COMMERCIAL

## 2024-06-26 VITALS — BODY MASS INDEX: 36.17 KG/M2 | WEIGHT: 305 LBS

## 2024-06-26 PROCEDURE — 93798 PHYS/QHP OP CAR RHAB W/ECG: CPT

## 2024-06-26 PROCEDURE — 93797 PHYS/QHP OP CAR RHAB WO ECG: CPT

## 2024-06-26 ASSESSMENT — EXERCISE STRESS TEST
PEAK_RPE: 13-14
PEAK_METS: 6.9
PEAK_HR: 136

## 2024-06-26 ASSESSMENT — LIFESTYLE VARIABLES
ALCOHOL_USE: WEEKLY
ALCOHOL_AMOUNT: 1-2
ALCOHOL_TYPE: BEER

## 2024-06-26 NOTE — PROGRESS NOTES
Cardiac Rehab Nutrition Assessment- 1:1 Evaluation  2024      NAME: Sebastian Lombardo : 1980 AGE: 44 y.o.  GENDER: male  CARDIAC REHAB ADMITTING DIAGNOSIS: Stented coronary artery [Z95.5]    PROBLEM LIST:  Patient Active Problem List   Diagnosis    Abnormal stress test    Coronary artery disease         LABS:   No results found for: \"LABA1C\"  Lab Results   Component Value Date     2024    K 4.4 2024     (H) 2024    CO2 26 2024    BUN 13 2024    CREATININE 1.02 2024    GLUCOSE 95 2024    CALCIUM 9.2 2024    LABGLOM >90 2024         Lab Results   Component Value Date    CHOL 104 2024    TRIG 46 2024    HDL 26 2024    LDL 68.8 2024    VLDL 9.2 2024    CHOLHDLRATIO 4.0 2024         MEDICATIONS/SUPPLEMENTS:   Scheduled Meds:  Continuous Infusions:  PRN Meds:.  Prior to Admission medications    Medication Sig Start Date End Date Taking? Authorizing Provider   coenzyme Q-10 100 MG capsule Take 1 capsule by mouth daily    ProviderNelli MD   lisinopril (PRINIVIL;ZESTRIL) 30 MG tablet Take 1 tablet by mouth daily    ProviderNelli MD   aspirin 81 MG EC tablet Take 1 tablet by mouth daily    ProviderNelli MD   ticagrelor (BRILINTA) 90 MG TABS tablet Take 1 tablet by mouth 2 times daily 3/26/24   Raghavendra Lombardo,    rosuvastatin (CRESTOR) 40 MG tablet Take 1 tablet by mouth daily 3/26/24   Raghavendra Lomabrdo,    ezetimibe (ZETIA) 10 MG tablet Take 1 tablet by mouth daily 3/26/24   Raghavendra Lombardo,            ANTHROPOMETRICS:    Ht Readings from Last 1 Encounters:   24 1.956 m (6' 5\")      Wt Readings from Last 10 Encounters:   24 (!) 137.9 kg (304 lb)   24 (!) 138.3 kg (305 lb)   24 (!) 137.9 kg (304 lb)   06/10/24 (!) 137.8 kg (303 lb 12.8 oz)   24 (!) 137.2 kg (302 lb 6.4 oz)   24 (!) 137.2 kg (302 lb 6.4 oz)   24 (!) 138.7 kg (305 lb 12.8 oz)

## 2024-06-28 ENCOUNTER — HOSPITAL ENCOUNTER (OUTPATIENT)
Facility: HOSPITAL | Age: 44
Setting detail: RECURRING SERIES
Discharge: HOME | End: 2024-07-01
Attending: STUDENT IN AN ORGANIZED HEALTH CARE EDUCATION/TRAINING PROGRAM
Payer: COMMERCIAL

## 2024-06-28 VITALS — WEIGHT: 303.2 LBS | BODY MASS INDEX: 35.95 KG/M2

## 2024-06-28 PROCEDURE — 93798 PHYS/QHP OP CAR RHAB W/ECG: CPT

## 2024-06-28 ASSESSMENT — PATIENT HEALTH QUESTIONNAIRE - PHQ9
9. THOUGHTS THAT YOU WOULD BE BETTER OFF DEAD, OR OF HURTING YOURSELF: NOT AT ALL
8. MOVING OR SPEAKING SO SLOWLY THAT OTHER PEOPLE COULD HAVE NOTICED. OR THE OPPOSITE, BEING SO FIGETY OR RESTLESS THAT YOU HAVE BEEN MOVING AROUND A LOT MORE THAN USUAL: NOT AT ALL
1. LITTLE INTEREST OR PLEASURE IN DOING THINGS: NOT AT ALL
SUM OF ALL RESPONSES TO PHQ9 QUESTIONS 1 & 2: 0
2. FEELING DOWN, DEPRESSED OR HOPELESS: NOT AT ALL
SUM OF ALL RESPONSES TO PHQ QUESTIONS 1-9: 0
6. FEELING BAD ABOUT YOURSELF - OR THAT YOU ARE A FAILURE OR HAVE LET YOURSELF OR YOUR FAMILY DOWN: NOT AT ALL
4. FEELING TIRED OR HAVING LITTLE ENERGY: NOT AT ALL
SUM OF ALL RESPONSES TO PHQ QUESTIONS 1-9: 0
5. POOR APPETITE OR OVEREATING: NOT AT ALL
7. TROUBLE CONCENTRATING ON THINGS, SUCH AS READING THE NEWSPAPER OR WATCHING TELEVISION: NOT AT ALL
SUM OF ALL RESPONSES TO PHQ QUESTIONS 1-9: 0
SUM OF ALL RESPONSES TO PHQ QUESTIONS 1-9: 0
3. TROUBLE FALLING OR STAYING ASLEEP: NOT AT ALL

## 2024-06-28 ASSESSMENT — EXERCISE STRESS TEST
PEAK_RPE: 13-14
PEAK_RPE: 14
PEAK_BP: 173/79
PEAK_HR: 151
PEAK_HR: 150
PEAK_BP: 173/79
PEAK_METS: 6.9

## 2024-06-28 NOTE — CARDIO/PULMONARY
DISCHARGE SUMMARY NOTE  2024      NAME: Sebastian Lombardo : 1980 AGE: 44 y.o.  GENDER: male    CARDIAC REHAB ADMITTING DIAGNOSIS: Stented coronary artery [Z95.5]    REFERRING PHYSICIAN: Raghavendra Lombardo DO    MEDICAL HX:  Past Medical History:   Diagnosis Date    Hyperlipidemia     Hypertension        LABS:     No results found for: \"HBA1C\", \"BNO6DJNP\"  Lab Results   Component Value Date/Time    CHOL 104 2024 11:56 AM    HDL 26 2024 11:56 AM    LDL 68.8 2024 11:56 AM    VLDL 9.2 2024 11:56 AM         ANTHROPOMETRICS:      Ht Readings from Last 1 Encounters:   24 1.956 m (6' 5\")      Wt Readings from Last 1 Encounters:   24 (!) 137.5 kg (303 lb 3.2 oz)        WAIST: 48         PROGRAM SUMMARY:    Sebastian Lombardo completed 18/36 sessions in the Cardiac Rehab program. He will continue exercising and focus on diet and nutrition at home. He attended 4 classes and is aware of his cardiac risk factors and cardiac medications. Weight loss was 7 lbs. MET level increase from 2.9 to 6.9.       Questions addressed. Discharge plans discussed. Sebastian Lombardo verbalized understanding.      Jojo Brewer RN

## (undated) DEVICE — CATH BLLN ANGIO 5X12MM NC EUPHORIA RX

## (undated) DEVICE — CATH BLLN ANGIO 4X12MM NC EUPHORIA RX

## (undated) DEVICE — ARGO BAGZ FLUID MANAGEMENT SYSTEM: Brand: ARGO BAGZ FLUID MANAGEMENT SYSTEM

## (undated) DEVICE — DEVICE INFL 20ML 30ATM DGT FLD DISPNS SYR W ACCESSPLUS BLU

## (undated) DEVICE — CATH BLLN ANGIO 3.50X15MM NC EUPHORA RX

## (undated) DEVICE — CATHETER DIAG 5FR L100CM LUMN ID0.047IN JL3.5 CRV 0 SIDE H

## (undated) DEVICE — KENDALL DL ECG DUAL CONNECT RADIOLUCENT LEAD WIRES, 5-LEAD, SINGLE PATIENT USE: Brand: KENDALL

## (undated) DEVICE — GUIDEWIRE VASC L180CM DIA0.035IN 3MM PTFE J TIP EXCHG FIX

## (undated) DEVICE — CATH BLLN ANGIO 3X12MM NC EUPHORIA RX

## (undated) DEVICE — Device: Brand: ASAHI SION BLUE

## (undated) DEVICE — CATHETER GUID 6FR L100CM DIA0.071IN NYL SHFT EBU3.5

## (undated) DEVICE — TR BAND RADIAL ARTERY COMPRESSION DEVICE: Brand: TR BAND

## (undated) DEVICE — TUBING PRSS MON L12IN PVC RIG NONEXPANDING M TO FEM CONN

## (undated) DEVICE — CATHETER GUID 6FR 0.071IN COR AMPLATZ L 0.75 MID

## (undated) DEVICE — SUPPORT WRST AD W3.5XL9IN DIA14.5IN ART SFT ADJ HK AND LOOP

## (undated) DEVICE — GLIDESHEATH SLENDER STAINLESS STEEL KIT: Brand: GLIDESHEATH SLENDER

## (undated) DEVICE — VALVE IV REFLX W/ M/F LUER LCK UNIV CAP STRL DISP

## (undated) DEVICE — HEART CATH-SFMC: Brand: MEDLINE INDUSTRIES, INC.

## (undated) DEVICE — VALVE HEMSTAT 8FR INNR LUMN CRSS SLT SEAL DSGN WATCHDOG

## (undated) DEVICE — CATHETER GUID 6FR DIA0.071IN SHFT NYL STD L JR 4 CRV ENH

## (undated) DEVICE — BAND COMPR L29CM XLN CLR PLAS HEMSTAT EXT HK AND LOOP RETEN

## (undated) DEVICE — Device: Brand: EAGLE EYE PLATINUM RX DIGITAL IVUS CATHETER

## (undated) DEVICE — MEDI-TRACE CADENCE ADULT, DEFIBRILLATION ELECTRODE -RTS  (10 PR/PK) - PHYSIO-CONTROL: Brand: MEDI-TRACE CADENCE

## (undated) DEVICE — CATHETER DIAG 5FR L100CM LUMN ID0.047IN JR4 CRV 0 SIDE H